# Patient Record
Sex: MALE | Race: ASIAN
[De-identification: names, ages, dates, MRNs, and addresses within clinical notes are randomized per-mention and may not be internally consistent; named-entity substitution may affect disease eponyms.]

---

## 2023-07-02 ENCOUNTER — HOSPITAL ENCOUNTER (EMERGENCY)
Dept: HOSPITAL 4 - SED | Age: 68
LOS: 1 days | Discharge: HOME | End: 2023-07-03
Payer: COMMERCIAL

## 2023-07-02 VITALS — SYSTOLIC BLOOD PRESSURE: 116 MMHG

## 2023-07-02 VITALS — HEIGHT: 65 IN | WEIGHT: 130 LBS | BODY MASS INDEX: 21.66 KG/M2

## 2023-07-02 DIAGNOSIS — Z79.899: ICD-10-CM

## 2023-07-02 DIAGNOSIS — N39.0: ICD-10-CM

## 2023-07-02 DIAGNOSIS — T83.091A: Primary | ICD-10-CM

## 2023-07-02 PROCEDURE — 87086 URINE CULTURE/COLONY COUNT: CPT

## 2023-07-02 PROCEDURE — 99284 EMERGENCY DEPT VISIT MOD MDM: CPT

## 2023-07-02 PROCEDURE — 51702 INSERT TEMP BLADDER CATH: CPT

## 2023-07-02 PROCEDURE — 85025 COMPLETE CBC W/AUTO DIFF WBC: CPT

## 2023-07-02 PROCEDURE — 80053 COMPREHEN METABOLIC PANEL: CPT

## 2023-07-02 PROCEDURE — 36415 COLL VENOUS BLD VENIPUNCTURE: CPT

## 2023-07-02 PROCEDURE — 96372 THER/PROPH/DIAG INJ SC/IM: CPT

## 2023-07-02 PROCEDURE — 81000 URINALYSIS NONAUTO W/SCOPE: CPT

## 2023-07-02 NOTE — NUR
PATIENT BROUGHT IN FROM [place] FOR A CARRILLO REMOVAL, PATIENT HAS LICE AND 
IS PLACED IN PRIVATE ROOM ON CONTACT ISOLATION, REPORT GIVEN TO MARI

## 2023-07-03 ENCOUNTER — HOSPITAL ENCOUNTER (EMERGENCY)
Dept: HOSPITAL 4 - SED | Age: 68
Discharge: PSYCHIATRIC OR SUBSTANCE USE FACILITY | End: 2023-07-03
Payer: COMMERCIAL

## 2023-07-03 VITALS — SYSTOLIC BLOOD PRESSURE: 134 MMHG

## 2023-07-03 VITALS — BODY MASS INDEX: 21.26 KG/M2 | HEIGHT: 63 IN | WEIGHT: 120 LBS

## 2023-07-03 VITALS — SYSTOLIC BLOOD PRESSURE: 135 MMHG

## 2023-07-03 VITALS — SYSTOLIC BLOOD PRESSURE: 132 MMHG

## 2023-07-03 DIAGNOSIS — Z46.6: Primary | ICD-10-CM

## 2023-07-03 DIAGNOSIS — Z79.899: ICD-10-CM

## 2023-07-03 LAB
ALBUMIN SERPL BCP-MCNC: 3 G/DL (ref 3.4–4.8)
ALT SERPL W P-5'-P-CCNC: 17 U/L (ref 12–78)
ANION GAP SERPL CALCULATED.3IONS-SCNC: 9 MMOL/L (ref 5–15)
APPEARANCE UR: (no result)
AST SERPL W P-5'-P-CCNC: 15 U/L (ref 10–37)
BACTERIA URNS QL MICRO: (no result) /HPF
BASOPHILS # BLD AUTO: 0.1 K/UL (ref 0–0.2)
BASOPHILS NFR BLD AUTO: 0.8 % (ref 0–2)
BILIRUB SERPL-MCNC: 0.3 MG/DL (ref 0–1)
BILIRUB UR QL STRIP: NEGATIVE
BUN SERPL-MCNC: 37 MG/DL (ref 8–21)
CALCIUM SERPL-MCNC: 8.9 MG/DL (ref 8.4–11)
CHLORIDE SERPL-SCNC: 107 MMOL/L (ref 98–107)
COLOR UR: (no result)
CREAT SERPL-MCNC: 1.38 MG/DL (ref 0.55–1.3)
EOSINOPHIL # BLD AUTO: 0.7 K/UL (ref 0–0.4)
EOSINOPHIL NFR BLD AUTO: 5.7 % (ref 0–4)
ERYTHROCYTE [DISTWIDTH] IN BLOOD BY AUTOMATED COUNT: 14.2 % (ref 9–15)
GFR SERPL CREATININE-BSD FRML MDRD: 66 ML/MIN (ref 90–?)
GLUCOSE SERPL-MCNC: 105 MG/DL (ref 74–106)
GLUCOSE UR STRIP-MCNC: NEGATIVE MG/DL
HCT VFR BLD AUTO: 39.5 % (ref 36–54)
HGB BLD-MCNC: 12.7 G/DL (ref 14–18)
HGB UR QL STRIP: (no result)
KETONES UR STRIP-MCNC: NEGATIVE MG/DL
LEUKOCYTE ESTERASE UR QL STRIP: (no result)
LYMPHOCYTES # BLD AUTO: 3.9 K/UL (ref 1–5.5)
LYMPHOCYTES NFR BLD AUTO: 31.6 % (ref 20.5–51.5)
MCH RBC QN AUTO: 29 PG (ref 27–31)
MCHC RBC AUTO-ENTMCNC: 32 % (ref 32–36)
MCV RBC AUTO: 89 FL (ref 79–98)
MONOCYTES # BLD MANUAL: 1 K/UL (ref 0–1)
MONOCYTES # BLD MANUAL: 7.8 % (ref 1.7–9.3)
NEUTROPHILS # BLD AUTO: 6.8 K/UL (ref 1.8–7.7)
NEUTROPHILS NFR BLD AUTO: 54.1 % (ref 40–70)
NITRITE UR QL STRIP: POSITIVE
PH UR STRIP: 7 [PH] (ref 5–8)
PLATELET # BLD AUTO: 329 K/UL (ref 130–430)
PROT UR QL STRIP: (no result)
RBC # BLD AUTO: 4.44 MIL/UL (ref 4.2–6.2)
RBC #/AREA URNS HPF: >100 /HPF (ref 0–3)
SP GR UR STRIP: 1.02 (ref 1–1.03)
UROBILINOGEN UR STRIP-MCNC: 1 MG/DL (ref 0.2–1)
WBC # BLD AUTO: 12.5 K/UL (ref 4.8–10.8)

## 2023-07-03 PROCEDURE — 99283 EMERGENCY DEPT VISIT LOW MDM: CPT

## 2023-07-03 NOTE — NUR
Patient given written and verbal discharge instructions and verbalizes 
understanding.  ER MD discussed with patient the results and treatment 
provided. Patient in stable condition. ID arm band removed.

Rx of MACROBID given. Patient educated on UTI and to follow up with PMD. Pain 
Scale .

Opportunity for questions provided and answered. Medication side effect fact 
sheet provided.

## 2023-07-03 NOTE — NUR
PT TRAIGED AND TAKEN TO BED 5. ENDORSED TO [facility]. [facility] CARRILLO CATH BAG. PT WAS SEEN IN [place] ED 
YESTERDAY FOR MEDICAL CLEARANCE. PT WAS BIB POLICE FOR MEDICAL CLEARANCE AND 
CAME IN WITH A CARRILLO IN PLACE. PT PULLED OUT CARRILLO WHILE AT ED YESTERDAY AND A 
NEW ONE WAS PLACED. AT THIS TIME PT'S CARRILLO HAD 600CC URINE WITH DARK RED BLOOD 
NOTED. PT IS AAOX2. ON R/A. SKIN CDI, DRY, WITH SCATTERED SMALL SCABS AND DIRT 
ENCRUSTED. 

PMH: MAJOR DEPRESSIVE DISORDER, HOMELESS.

## 2023-07-03 NOTE — NUR
# 16 FR Guy catheter with use of sterile technique. Immediate return of  cc 
DARK RED urine noted.  Bedside drainage bag placed below level of bladder.  
Urine sample collected and sent to lab.  Pt tolerated procedure .



Patient arrived with guy in place, changed DUE TO DR CALVERT



Patient unable to toilet self. [FreeTextEntry1] : 60 year old male with history of renal transplant in 02/2020, DM Type 2 on insulin here for follow-up. \par SMBGs are much better controlled at this time.Lost 20 lbs. \par \par DM Type 2 : Hga1C of 7.0% \par -No changes made to pump\par -Continue Jardiance 25 mg daily\par -Continue Victoza 1.8 mcg daily\par -Advised that BG levels will improve, and if he experiences any hypos to call the office. Advised against overbolusing. \par -Healthier snacks were discussed with the patient \par -SMBGS with DEXCOM \par \par Renal transplant\par -Use of prednisone causing hyperglycemia in the evening \par -Use of tacrolimus increases insulin resistance \par \par HLD\par -Continue Atorvastatin 20 mg daily\par \par \par -Follow up in 6 months. \par \par  \par \par

## 2023-07-03 NOTE — NUR
DR. NGUYỄN AT BEDSIDE TO ASSESS PT. Telephone Encounter by Catherine Santana CMA at 04/03/17 01:22 PM     Author:  Catherine Santana CMA Service:  (none) Author Type:  Certified Medical Assistant     Filed:  04/03/17 01:38 PM Encounter Date:  4/3/2017 Status:  Signed     :  Catherine Santana CMA (Certified Medical Assistant)            Patient last seen 2/4/16 with a follow up overdue from 05/2016. Patient needs to schedule appointment in order to continue with refills.   Spoke with patient's mother- unable to schedule appointment at this time, she'll look over her schedule and call back to book appointment. Patient's mother is aware appointment is needed in order to continue with refills.[KA1.1M]       Revision History        User Key Date/Time User Provider Type Action    > KA1.1 04/03/17 01:38 PM Catherine Santana CMA Certified Medical Assistant Sign    M - Manual

## 2023-07-03 NOTE — NUR
ASSUMED CARE FROM NURSE.  PT STABLE.  JUST TOOK A SHOWER AND IN BED EATING 
BREAKFAST.  PT DENIES PAIN AT CURRENT TIME.

## 2023-07-03 NOTE — NUR
Patient given written and verbal discharge instructions and verbalizes 
understanding.  ER MD NGUYỄN discussed with patient the results and treatment 
provided. Patient in stable condition. ID arm band removed. 

Patient educated on pain management and to follow up with PMD. Pain Scale 0/10.

Opportunity for questions provided and answered. Medication side effect fact 
sheet provided. [facility] staff escorting Patient with BLS transport.